# Patient Record
Sex: FEMALE | Race: WHITE | NOT HISPANIC OR LATINO | URBAN - NONMETROPOLITAN AREA
[De-identification: names, ages, dates, MRNs, and addresses within clinical notes are randomized per-mention and may not be internally consistent; named-entity substitution may affect disease eponyms.]

---

## 2019-01-18 ENCOUNTER — IMPORTED ENCOUNTER (OUTPATIENT)
Dept: URBAN - NONMETROPOLITAN AREA CLINIC 1 | Facility: CLINIC | Age: 84
End: 2019-01-18

## 2019-01-18 PROCEDURE — 99213 OFFICE O/P EST LOW 20 MIN: CPT

## 2019-01-18 NOTE — PATIENT DISCUSSION
Fuchs Endothelial Corneal Dystrophy OUDiscussed findings of exam in detail with the patient. Discussed the risk of corneal edema with vision loss and the importance of monitoring this chronic disease. VA has improved today No change in striae on today's slitlamp exam.Continue Vince 128 QID OU Start Prolensa QD OD sample given todayWill continue to monitor. RTC in 2 week for follow up Pseudophakia OUBoth intraocular lenses in place today and stable. Continue to monitor. PVD OUDiscussed findings of exam in detail with the patient. The risk of retinal detachment in patients with PVDs was discussed with the patient and the warning signs of retinal detachment were carefully reviewed with the patient. The patient was warned to return to the office or contact the ophthalmologist on call immediately if they experience signs of retinal detachment. Continue to monitor. Presbyopia OUDiscussed refractive status in detail with patient. Continue to monitor.

## 2019-02-01 ENCOUNTER — IMPORTED ENCOUNTER (OUTPATIENT)
Dept: URBAN - NONMETROPOLITAN AREA CLINIC 1 | Facility: CLINIC | Age: 84
End: 2019-02-01

## 2019-02-01 PROCEDURE — 99213 OFFICE O/P EST LOW 20 MIN: CPT

## 2019-02-01 NOTE — PATIENT DISCUSSION
Fuchs Endothelial Corneal Dystrophy OUDiscussed findings of exam in detail with the patient. Discussed the risk of corneal edema with vision loss and the importance of monitoring this chronic disease. No change in striae on today's slitlamp exam.Continue Vince 128 QID OU D/C Prolensa QD ODRecommend refer to Dr. Roz Chand for further evaluation and treatment. Patient agrees with Mj Posey intraocular lenses in place today and stable. Continue to monitor. PVD OUDiscussed findings of exam in detail with the patient. The risk of retinal detachment in patients with PVDs was discussed with the patient and the warning signs of retinal detachment were carefully reviewed with the patient. The patient was warned to return to the office or contact the ophthalmologist on call immediately if they experience signs of retinal detachment. Continue to monitor. Presbyopia OUDiscussed refractive status in detail with patient. Continue to monitor.

## 2022-04-10 ASSESSMENT — TONOMETRY
OD_IOP_MMHG: 15
OD_IOP_MMHG: 15
OS_IOP_MMHG: 16
OS_IOP_MMHG: 14

## 2022-04-10 ASSESSMENT — VISUAL ACUITY
OS_SC: 20/40+
OD_SC: 20/63-2
OD_PH: 20/63+
OD_SC: 20/50+1
OS_SC: 20/29+1

## 2022-04-10 ASSESSMENT — PACHYMETRY
OD_CT_UM: 603; ADJ: THICK
OD_CT_UM: 603; ADJ: THICK
OS_CT_UM: 592; ADJ: THICK
OS_CT_UM: 592; ADJ: THICK